# Patient Record
Sex: FEMALE | Race: WHITE | NOT HISPANIC OR LATINO | URBAN - METROPOLITAN AREA
[De-identification: names, ages, dates, MRNs, and addresses within clinical notes are randomized per-mention and may not be internally consistent; named-entity substitution may affect disease eponyms.]

---

## 2021-08-20 ENCOUNTER — OFFICE VISIT (OUTPATIENT)
Dept: URBAN - METROPOLITAN AREA CLINIC 94 | Facility: CLINIC | Age: 36
End: 2021-08-20
Payer: MEDICAID

## 2021-08-20 ENCOUNTER — DASHBOARD ENCOUNTERS (OUTPATIENT)
Age: 36
End: 2021-08-20

## 2021-08-20 ENCOUNTER — WEB ENCOUNTER (OUTPATIENT)
Dept: URBAN - METROPOLITAN AREA CLINIC 94 | Facility: CLINIC | Age: 36
End: 2021-08-20

## 2021-08-20 VITALS
HEART RATE: 70 BPM | HEIGHT: 69 IN | WEIGHT: 188 LBS | BODY MASS INDEX: 27.85 KG/M2 | SYSTOLIC BLOOD PRESSURE: 122 MMHG | TEMPERATURE: 98.1 F | DIASTOLIC BLOOD PRESSURE: 83 MMHG

## 2021-08-20 DIAGNOSIS — K50.919 CROHN'S DISEASE WITH COMPLICATION, UNSPECIFIED GASTROINTESTINAL TRACT LOCATION: ICD-10-CM

## 2021-08-20 DIAGNOSIS — R10.84 GENERALIZED ABDOMINAL PAIN: ICD-10-CM

## 2021-08-20 DIAGNOSIS — R19.7 DIARRHEA, UNSPECIFIED TYPE: ICD-10-CM

## 2021-08-20 PROBLEM — 34000006: Status: ACTIVE | Noted: 2021-08-20

## 2021-08-20 PROCEDURE — 99214 OFFICE O/P EST MOD 30 MIN: CPT | Performed by: INTERNAL MEDICINE

## 2021-08-20 NOTE — HPI-TODAY'S VISIT:
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 However, also reports had had a few hospitalizaitons for SBO, which have been treated with NG tube and steorids, last in April. 3 weeks ago started to have significant abdominal discomfort, whenever she eats, along with increased ostomy output. Feels like a Crohn's flare to her - her eczema is also worse which happens with Crohn's flare Is not on any medication.    CROHN'S DISEASE HX Patient reported diagnosed with Crohn's at age 17, in 2022. She was initially under the care of Dr. Nevarez and Rx Prednsione,Pentasa and Imuran. Remained symptomatic so in 2010 she underwent a subtotal colectomy with ileostomy. Following surgery she continued to flare and remained off medication. In 2013 she underwent a ostomy reversal. Unfortunately disease remained active. She also had a fistula and reportedly underwent fistulotomy in 2014 with Dr Iverson. She began seeing DR. Dickinson in 2016 who RX prednisone and Remicade x 1 year. Follow-up colonoscopy revealed active disease so medication was discontinued especially given side effects. Humira was recommended but when patient refused medication she was discharged. In 2017 she underwent end ileostomy(with removal of colon with Dr Iverson). She was stable until June 2018 when she began developing ulcerations of her skin around her stoma and two around her anus. She was seen by Dr Harper once in 2019 for anal and vaginal drainage from possible fistula, where she declined medical treatment with biologics and so referred to Dr Iverson.

## 2021-08-20 NOTE — PHYSICAL EXAM GASTROINTESTINAL
Abdomen , soft, tenderness in abdomen, without rebound or guarding, ostomy present, prior surgical scar present , Liver and Spleen , no hepatomegaly present , no hepatosplenomegaly , liver nontender , spleen not palpable

## 2021-08-23 ENCOUNTER — TELEPHONE ENCOUNTER (OUTPATIENT)
Dept: URBAN - METROPOLITAN AREA CLINIC 94 | Facility: CLINIC | Age: 36
End: 2021-08-23

## 2021-08-23 ENCOUNTER — LAB OUTSIDE AN ENCOUNTER (OUTPATIENT)
Dept: URBAN - METROPOLITAN AREA CLINIC 94 | Facility: CLINIC | Age: 36
End: 2021-08-23

## 2021-08-24 LAB
A/G RATIO: 1.4
ALBUMIN: 4.8
ALKALINE PHOSPHATASE: 115
ALT (SGPT): 6
AST (SGOT): 13
BILIRUBIN, TOTAL: 0.5
BUN/CREATININE RATIO: 22
BUN: 20
C-REACTIVE PROTEIN, QUANT: 65
CALCIUM: 9.7
CARBON DIOXIDE, TOTAL: 19
CHLORIDE: 96
CREATININE: 0.9
EGFR IF AFRICN AM: 95
EGFR IF NONAFRICN AM: 83
GLOBULIN, TOTAL: 3.5
GLUCOSE: 56
HBSAG SCREEN: NEGATIVE
HEMATOCRIT: 42.6
HEMOGLOBIN: 13.7
HEP B CORE AB, TOT: NEGATIVE
HEPATITIS B SURF AB QUANT: <3.1
MCH: 28.7
MCHC: 32.2
MCV: 89
NRBC: (no result)
PLATELETS: 360
POTASSIUM: 4.2
PROTEIN, TOTAL: 8.3
QUANTIFERON CRITERIA: (no result)
QUANTIFERON INCUBATION: (no result)
QUANTIFERON MITOGEN VALUE: 7.14
QUANTIFERON NIL VALUE: 0.27
QUANTIFERON TB1 AG VALUE: 0.27
QUANTIFERON TB2 AG VALUE: 0.33
QUANTIFERON-TB GOLD PLUS: NEGATIVE
RBC: 4.77
RDW: 12.1
SEDIMENTATION RATE-WESTERGREN: 61
SODIUM: 137
WBC: 8.9

## 2021-08-25 LAB
C. DIFFICILE TOXIN A/B, STOOL - QDX: NEGATIVE
CALPROTECTIN, STOOL - QDX: (no result)
GASTROINTESTINAL PATHOGEN: (no result)

## 2021-08-30 ENCOUNTER — TELEPHONE ENCOUNTER (OUTPATIENT)
Dept: URBAN - METROPOLITAN AREA CLINIC 94 | Facility: CLINIC | Age: 36
End: 2021-08-30

## 2021-09-21 ENCOUNTER — OFFICE VISIT (OUTPATIENT)
Dept: URBAN - METROPOLITAN AREA CLINIC 94 | Facility: CLINIC | Age: 36
End: 2021-09-21

## 2021-09-28 ENCOUNTER — TELEPHONE ENCOUNTER (OUTPATIENT)
Dept: URBAN - METROPOLITAN AREA CLINIC 94 | Facility: CLINIC | Age: 36
End: 2021-09-28

## 2021-09-28 ENCOUNTER — OFFICE VISIT (OUTPATIENT)
Dept: URBAN - METROPOLITAN AREA CLINIC 94 | Facility: CLINIC | Age: 36
End: 2021-09-28